# Patient Record
Sex: FEMALE | Race: WHITE
[De-identification: names, ages, dates, MRNs, and addresses within clinical notes are randomized per-mention and may not be internally consistent; named-entity substitution may affect disease eponyms.]

---

## 2018-10-23 ENCOUNTER — HOSPITAL ENCOUNTER (EMERGENCY)
Dept: HOSPITAL 80 - FED | Age: 28
Discharge: HOME | End: 2018-10-23
Payer: COMMERCIAL

## 2018-10-23 VITALS — DIASTOLIC BLOOD PRESSURE: 96 MMHG | SYSTOLIC BLOOD PRESSURE: 140 MMHG

## 2018-10-23 DIAGNOSIS — K90.0: ICD-10-CM
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
## 2018-10-23 DIAGNOSIS — S30.1XXA: Primary | ICD-10-CM

## 2018-10-23 LAB — PLATELET # BLD: 93 10^3/UL (ref 150–400)

## 2018-10-23 NOTE — EDPHY
H & P


Stated Complaint: RLQ abd pain


Time Seen by Provider: 10/23/18 00:26


HPI/ROS: 





HPI





CHIEF COMPLAINT:  Right lower quadrant abdominal pain.





HISTORY OF PRESENT ILLNESS:  27-year-old female, otherwise healthy, has a 

history of celiac disease, presents emergency room with right lower quadrant 

pain.  Patient states around 8:00 p.m. This evening she was at the gym she was 

hanging and lifting up her legs.  She thinks she may have injured her abdominal 

wall or abdominal musculature.  She states she does remember a little bit of 

discomfort at that time she had to stop doing her exercise however the pain is 

gotten very intense. It has gotten worse.  Pain is located in the right lower 

quadrant.  It is worse when she goes to sit up or move her legs.  She denies 

any urinary symptoms or vaginal discharge.  Denies acute abdominal trauma.





Past Medical History:  Denies significant medical history except for celiac 

disease





Past Surgical History:  Is denies surgical history





Social History:  Denies daily use of drugs alcohol tobacco.





Family History:  Noncontributory








ROS   


REVIEW OF SYSTEMS:


10 Systems were reviewed and negative with the exception of the elements 

mentioned in the history of present illness.








Exam   


Constitutional   triage nursing summary reviewed, vital signs reviewed, awake/

alert. 


Eyes   normal conjunctivae and sclera, EOMI, PERRLA. 


HENT   normal inspection, atraumatic, moist mucus membranes, no epistaxis, neck 

supple/ no meningismus, no raccoon eyes. 


Respiratory   clear to auscultation bilaterally, normal breath sounds, no 

respiratory distress, no wheezing. 


Cardiovascular   rate normal, regular rhythm, no murmur, no edema, distal 

pulses normal. 


Gastrointestinal mild tender palpation right lower quadrant, no peritoneal signs

, with raising of her right leg against resistance causes her right lower 

abdominal pain, additionally when she goes to sit up it causes her discomfort.  

I do not palpate a mass on her abdominal wall hematoma.  no rebound, no guarding

, normal bowel sounds, no distension, no pulsatile mass. 


Genitourinary   no CVA tenderness. 


Musculoskeletal  no midline vertebral tenderness, full range of motion, no calf 

swelling, no tenderness of extremities, no meningismus, good pulses, 

neurovascularly intact.


Skin   pink, warm, & dry, no rash, skin atraumatic. 


Neurologic   awake, alert and oriented x 3, AAOx3, moves all 4 extremities 

equally, motor intact, sensory intact, CN II-XII intact, normal cerebellar, 

normal vision, normal speech. 


Psychiatric   normal mood/affect. 


Heme/Lymph/Immune   no lymphadenopathy.





Differential diagnosis includes but is not limited to and in no particular order

:  Musculoskeletal nature, Bowel obstruction, appendicitis, gallbladder disease

, diverticulitis, colitis, enteritis, perforated viscus, gastritis, GERD, 

esophagitis, urinary tract infection, pyelonephritis, kidney stones





Medical Decision Making:  Plan for this patient IV establishment IV fluid bolus

, IV Toradol 15 mg for pain control, basic blood work, CT scan abdomen pelvis 

with IV contrast re-evaluate.  Rule out appendicitis.  Reason for CT scan rule 

out right lower quadrant abdominal pain causes including appendicitis versus 

abdominal wall tear.





Re-evaluation:








CT scan abdomen pelvis with IV contrast shows a right-sided rectus sheath 

abdominal wall musculature hematoma.  Very small 1.5 cm.  Otherwise no acute 

intra-abdominal inflammatory process.





Blood work reviewed.





CT scan reviewed.





Shows abdominal wall hematoma.





Recommend cool compresses, anti-inflammatory pain medicine.





Rest.





Return precautions discussed return if worsening pain, swelling, fever not 

doing well she understands.


Source: Patient





- Personal History


LMP (Females 10-55): 15-21 Days Ago


Current Tetanus/Diphtheria Vaccine: Yes


Current Tetanus Diphtheria and Acellular Pertussis (TDAP): Yes





- Medical/Surgical History


Hx Asthma: No


Hx Chronic Respiratory Disease: No


Hx Diabetes: No


Hx Cardiac Disease: No


Hx Renal Disease: No


Hx Cirrhosis: No


Hx Alcoholism: No


Hx HIV/AIDS: No


Hx Splenectomy or Spleen Trauma: No


Other PMH: Celiac





- Social History


Smoking Status: Never smoked


Constitutional: 


 Initial Vital Signs











Temperature (C)  37.7 C   10/23/18 00:15


 


Heart Rate  83   10/23/18 00:15


 


Respiratory Rate  16   10/23/18 00:15


 


Blood Pressure  140/96 H  10/23/18 00:15


 


O2 Sat (%)  98   10/23/18 00:15








 











O2 Delivery Mode               Room Air














Allergies/Adverse Reactions: 


 





No Known Allergies Allergy (Unverified 10/23/18 00:18)


 








Home Medications: 














 Medication  Instructions  Recorded


 


Birthcontrol  10/23/18


 


Celapram  10/23/18














Medical Decision Making





- Data Points


Laboratory Results: 


 Laboratory Results





 10/23/18 00:35 





 10/23/18 00:36 





 











  10/23/18 10/23/18 10/23/18





  00:36 00:36 00:35


 


WBC      8.10 10^3/uL 10^3/uL





     (3.80-9.50) 


 


RBC      4.87 10^6/uL 10^6/uL





     (4.18-5.33) 


 


Hgb      13.8 g/dL g/dL





     (12.6-16.3) 


 


Hct      39.7 % %





     (38.0-47.0) 


 


MCV      81.5 fL fL





     (81.5-99.8) 


 


MCH      28.3 pg pg





     (27.9-34.1) 


 


MCHC      34.8 g/dL g/dL





     (32.4-36.7) 


 


RDW      12.7 % %





     (11.5-15.2) 


 


Plt Count      93 10^3/uL L 10^3/uL





     (150-400) 


 


MPV      11.7 fL fL





     (8.7-11.7) 


 


Neut % (Auto)      48.9 % %





     (39.3-74.2) 


 


Lymph % (Auto)      40.7 % %





     (15.0-45.0) 


 


Mono % (Auto)      8.9 % %





     (4.5-13.0) 


 


Eos % (Auto)      0.9 % %





     (0.6-7.6) 


 


Baso % (Auto)      0.4 % %





     (0.3-1.7) 


 


Nucleat RBC Rel Count      0.0 % %





     (0.0-0.2) 


 


Absolute Neuts (auto)      3.96 10^3/uL 10^3/uL





     (1.70-6.50) 


 


Absolute Lymphs (auto)      3.30 10^3/uL H 10^3/uL





     (1.00-3.00) 


 


Absolute Monos (auto)      0.72 10^3/uL 10^3/uL





     (0.30-0.80) 


 


Absolute Eos (auto)      0.07 10^3/uL 10^3/uL





     (0.03-0.40) 


 


Absolute Basos (auto)      0.03 10^3/uL 10^3/uL





     (0.02-0.10) 


 


Absolute Nucleated RBC      0.00 10^3/uL 10^3/uL





     (0-0.01) 


 


Immature Gran %      0.2 % %





     (0.0-1.1) 


 


Immature Gran #      0.02 10^3/uL 10^3/uL





     (0.00-0.10) 


 


Sodium    138 mEq/L mEq/L  





    (135-145)  


 


Potassium    3.4 mEq/L mEq/L  





    (3.3-5.0)  


 


Chloride    107 mEq/L mEq/L  





    ()  


 


Carbon Dioxide    23 mEq/l mEq/l  





    (22-31)  


 


Anion Gap    8 mEq/L mEq/L  





    (6-14)  


 


BUN    9 mg/dL mg/dL  





    (7-23)  


 


Creatinine    0.6 mg/dL mg/dL  





    (0.6-1.0)  


 


Estimated GFR    > 60   





    


 


Glucose    99 mg/dL mg/dL  





    ()  


 


Calcium    9.1 mg/dL mg/dL  





    (8.5-10.4)  


 


Total Bilirubin    0.4 mg/dL mg/dL  





    (0.1-1.4)  


 


Conjugated Bilirubin    0.1 mg/dL mg/dL  





    (0.0-0.5)  


 


Unconjugated Bilirubin    0.3 mg/dL mg/dL  





    (0.0-1.1)  


 


AST    48 IU/L H IU/L  





    (14-46)  


 


ALT    61 IU/L H IU/L  





    (9-52)  


 


Alkaline Phosphatase    52 IU/L IU/L  





    ()  


 


Total Protein    7.1 g/dL g/dL  





    (6.3-8.2)  


 


Albumin    4.1 g/dL g/dL  





    (3.5-5.0)  


 


Lipase    76 IU/L IU/L  





    ()  


 


Beta HCG, Qual  NEGATIVE     





    











Medications Given: 


 








Discontinued Medications





Sodium Chloride (Ns)  1,000 mls @ 0 mls/hr IV EDNOW ONE; Wide Open


   PRN Reason: Protocol


   Stop: 10/23/18 00:27


   Last Admin: 10/23/18 01:08 Dose:  Not Given


Ketorolac Tromethamine (Toradol)  15 mg IVP EDNOW ONE


   Stop: 10/23/18 00:32


   Last Admin: 10/23/18 01:08 Dose:  Not Given








Departure





- Departure


Disposition: Home, Routine, Self-Care


Clinical Impression: 


Abdominal wall hematoma


Qualifiers:


 Encounter type: initial encounter Qualified Code(s): S30.1XXA - Contusion of 

abdominal wall, initial encounter





Condition: Good


Instructions:  Hematoma (ED)


Additional Instructions: 


1. Take it easy.


2. Anti-inflammatory pain medicine for pain control


3. Ice pack.


4. Return if worse worsening pain, swelling or not doing well.


Referrals: 


Ariadne Gonzalez MD [Primary Care Provider] - As per Instructions